# Patient Record
Sex: MALE | Race: OTHER | Employment: FULL TIME | URBAN - METROPOLITAN AREA
[De-identification: names, ages, dates, MRNs, and addresses within clinical notes are randomized per-mention and may not be internally consistent; named-entity substitution may affect disease eponyms.]

---

## 2019-11-18 ENCOUNTER — OFFICE VISIT (OUTPATIENT)
Dept: INTERNAL MEDICINE CLINIC | Age: 39
End: 2019-11-18

## 2019-11-18 VITALS
HEART RATE: 60 BPM | RESPIRATION RATE: 18 BRPM | WEIGHT: 226.4 LBS | HEIGHT: 70 IN | TEMPERATURE: 98.6 F | SYSTOLIC BLOOD PRESSURE: 164 MMHG | OXYGEN SATURATION: 96 % | BODY MASS INDEX: 32.41 KG/M2 | DIASTOLIC BLOOD PRESSURE: 80 MMHG

## 2019-11-18 DIAGNOSIS — C64.2 CANCER OF LEFT KIDNEY PARENCHYMA (HCC): ICD-10-CM

## 2019-11-18 DIAGNOSIS — R10.9 ABDOMINAL PAIN, UNSPECIFIED ABDOMINAL LOCATION: ICD-10-CM

## 2019-11-18 DIAGNOSIS — C80.1 CANCER (HCC): ICD-10-CM

## 2019-11-18 DIAGNOSIS — Z00.00 PREVENTATIVE HEALTH CARE: Primary | ICD-10-CM

## 2019-11-18 PROBLEM — I10 HTN (HYPERTENSION): Status: ACTIVE | Noted: 2019-11-18

## 2019-11-18 RX ORDER — AMLODIPINE BESYLATE 2.5 MG/1
2.5 TABLET ORAL DAILY
Qty: 30 TAB | Refills: 11 | Status: SHIPPED | OUTPATIENT
Start: 2019-11-18

## 2019-11-18 RX ORDER — POLYETHYLENE GLYCOL 3350 17 G/17G
17 POWDER, FOR SOLUTION ORAL 2 TIMES DAILY
Qty: 60 PACKET | Refills: 11 | Status: SHIPPED | OUTPATIENT
Start: 2019-11-18

## 2019-11-18 NOTE — PROGRESS NOTES
SPORTS MEDICINE AND PRIMARY CARE  Carolyn Barrios MD, [address] [address]  Phone:  [phone]  Fax: [phone]    Chief Complaint   Patient presents with    Establish Care       SUBJECTIVE:    Sven Ritchie is a 45 y.o. male Patient is seen as a new patient for evaluation and ongoing care. Review of [facility] notes indicates he has cancer and he comes in for evaluation of abdominal pain. Patient comes in today stating in 2015 he was found to have stage III left kidney cancer, underwent a nephrectomy at [facility] in [place], [place]. For the next five years he was recommended to have a chest x-ray, as well as a CT of the abdomen, looking for recurrence. The most recent CT was performed at the imaging center here in [address], as well as a chest x-ray about three weeks ago. Results are not available to us and he is seen for evaluation. However, since Friday he has noted some bloating and sensation of constipation. He has had a bowel movement, but still does not feel he has evacuated completely and is seen for evaluation. Presumably the CT scan was negative. Past Medical History:   Diagnosis Date    Abdominal pain     Cancer of left kidney parenchyma ([facility] [address] 2015    nephrectomy    HTN (hypertension) 11/18/2019     History reviewed. No pertinent surgical history.   Not on File    REVIEW OF SYSTEMS:  General: negative for - chills or fever  ENT: negative for - headaches, nasal congestion or tinnitus  Respiratory: negative for - cough, hemoptysis, shortness of breath or wheezing  Cardiovascular : negative for - chest pain, edema, palpitations or shortness of breath  Gastrointestinal: negative for - abdominal pain, blood in stools, heartburn or nausea/vomiting  Genito-Urinary: no dysuria, trouble voiding, or hematuria  Musculoskeletal: negative for - gait disturbance, joint pain, joint stiffness or joint swelling  Neurological: no TIA or stroke symptoms  Hematologic: no bruises, no bleeding, no swollen glands  Integument: no lumps, mole changes, nail changes or rash  Endocrine:no malaise/lethargy or unexpected weight changes      Social History     Socioeconomic History    Marital status: SINGLE     Spouse name: Not on file    Number of children: Not on file    Years of education: Not on file    Highest education level: Not on file   Tobacco Use    Smoking status: Never Smoker    Smokeless tobacco: Never Used   Substance and Sexual Activity    Alcohol use: Yes     Comment: occasional    Drug use: Never    Sexual activity: Yes     Partners: Female     Birth control/protection: None     Family History   Problem Relation Age of Onset    Heart Disease Mother     Hypertension Father    Habits:  Occasional alcohol use. Discontinued cigarette use at the time of the diagnosis in 2015. No drug abuse. Social History:  The patient has a domestic female partner, they plan to get . They have no children. He has a bachelor's degree in [place], master's degree in creative writing, second master's degree in rhetoric and composition. 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 No Mormon preference. Family History:  Father 67 with hypertension. Mother 71 with atrial fibrillation. Two sisters, one brother, are alive and well. OBJECTIVE:     Visit Vitals  /80   Pulse 60   Temp 98.6 °F (37 °C) (Oral)   Resp 18   Ht 5' 10\" (1.778 m)   Wt 226 lb 6.4 oz (102.7 kg)   SpO2 96%   BMI 32.49 kg/m²     CONSTITUTIONAL: well , well nourished, appears age appropriate  EYES: perrla, eom intact  ENMT:moist mucous membranes, pharynx clear  NECK: supple.  Thyroid normal  RESPIRATORY: Chest: clear bilaterally  CARDIOVASCULAR: Heart: regular rate and rhythm  GASTROINTESTINAL: Abdomen: soft, bowel sounds active  HEMATOLOGIC: no pathological lymph nodes palpated  MUSCULOSKELETAL: Extremities: no edema, pulse 1+   INTEGUMENT: No unusual rashes or suspicious skin lesions noted. Nails appear normal.  NEUROLOGIC: non-focal exam   MENTAL STATUS: alert and oriented, appropriate affect     No results found for any previous visit. ASSESSMENT:   1. Preventative health care    2. Cancer ([facility] [address]    3. Abdominal pain, unspecified abdominal location    4. Cancer of left kidney parenchyma [facility])      This will complete a preventive healthcare visit, for which we discuss our recommendations. He has abdominal pain that is concerning to me. It may very well be related to adhesions, which I suspect is the case, but I think because of the change in bowel habits he should have a colonoscopy at some point in time. There is no urgency to it. In the interim, however, will place him on Miralax twice a day until his bowels are regular again and moving uneventfully. I am concerned about the history of nephrectomy, particularly since he said it was stage III, and will ask for oncology opinion to see if there is anything that should be recommended for him. In addition will refer him to urologist so he can get connected with someone that will check his CT and chest x-ray recommended by his previous urologist every six months for a total of five years and then yearly thereafter. 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 We encouraged him to continue his physical activity 30 minutes five days a week. We point out to him, however, his BMI represents obesity and certainly would like to see him get out of that range. Blood pressure elevation is noted. He says it was borderline before. We advised him that we would like to see it less than 120/80, and therefore will place him on Amlodipine 5 mg daily. He will come by in two weeks for blood pressure check and we will see him twice a year for that concern. We also note a murmur in the left precordial area and will ask for an echocardiogram to define the origin. He is agreeable to the plan. Discussed the patient's BMI with him. The BMI follow up plan is as follows:     dietary management education, guidance, and counseling  encourage exercise  monitor weight  prescribed dietary intake  I have discussed the diagnosis with the patient and the intended plan as seen in the  orders above. The patient understands and agees with the plan. The patient has   received an after visit summary and questions were answered concerning  future plans  Patient labs and/or xrays were reviewed  Past records were reviewed. PLAN:  .  Orders Placed This Encounter    URINALYSIS W/ RFLX MICROSCOPIC    CBC WITH AUTOMATED DIFF    METABOLIC PANEL, COMPREHENSIVE    LIPID PANEL    HEMOGLOBIN A1C WITH EAG    REFERRAL TO GASTROENTEROLOGY    [facility] Oncology ref [facility]    REFERRAL TO UROLOGY    AMB POC EKG ROUTINE W/ 12 LEADS, INTER & REP    polyethylene glycol (MIRALAX) 17 gram packet    amLODIPine (NORVASC) 2.5 mg tablet       Follow-up and Dispositions    · Return in about 2 weeks (around 12/2/2019) for bp check. ATTENTION:   This medical record was transcribed using an electronic medical records system. Although proofread, it may and can contain electronic and spelling errors. Other human spelling and other errors may be present. Corrections may be executed at a later time. Please feel free to contact us for any clarifications as needed.

## 2019-11-18 NOTE — PATIENT INSTRUCTIONS
Body Mass Index: Care Instructions Your Care Instructions Body mass index (BMI) can help you see if your weight is raising your risk for health problems. It uses a formula to compare how much you weigh with how tall you are. · A BMI lower than 18.5 is considered underweight. · A BMI between 18.5 and 24.9 is considered healthy. · A BMI between 25 and 29.9 is considered overweight. A BMI of 30 or higher is considered obese. If your BMI is in the normal range, it means that you have a lower risk for weight-related health problems. If your BMI is in the overweight or obese range, you may be at increased risk for weight-related health problems, such as high blood pressure, heart disease, stroke, arthritis or joint pain, and diabetes. If your BMI is in the underweight range, you may be at increased risk for health problems such as fatigue, lower protection (immunity) against illness, muscle loss, bone loss, hair loss, and hormone problems. BMI is just one measure of your risk for weight-related health problems. You may be at higher risk for health problems if you are not active, you eat an unhealthy diet, or you drink too much alcohol or use tobacco products. Follow-up care is a key part of your treatment and safety. Be sure to make and go to all appointments, and call your doctor if you are having problems. It's also a good idea to know your test results and keep a list of the medicines you take. How can you care for yourself at home? · Practice healthy eating habits. This includes eating plenty of fruits, vegetables, whole grains, lean protein, and low-fat dairy. · If your doctor recommends it, get more exercise. Walking is a good choice. Bit by bit, increase the amount you walk every day. Try for at least 30 minutes on most days of the week. · Do not smoke. Smoking can increase your risk for health problems.  If you need help quitting, talk to your doctor about stop-smoking programs and medicines. These can increase your chances of quitting for good. · Limit alcohol to 2 drinks a day for men and 1 drink a day for women. Too much alcohol can cause health problems. If you have a BMI higher than 25 · Your doctor may do other tests to check your risk for weight-related health problems. This may include measuring the distance around your waist. A waist measurement of more than 40 inches in men or 35 inches in women can increase the risk of weight-related health problems. · Talk with your doctor about steps you can take to stay healthy or improve your health. You may need to make lifestyle changes to lose weight and stay healthy, such as changing your diet and getting regular exercise. If you have a BMI lower than 18.5 · Your doctor may do other tests to check your risk for health problems. · Talk with your doctor about steps you can take to stay healthy or improve your health. You may need to make lifestyle changes to gain or maintain weight and stay healthy, such as getting more healthy foods in your diet and doing exercises to build muscle. Where can you learn more? Go to http://jay jay-enoch.info/. Enter S176 in the search box to learn more about \"Body Mass Index: Care Instructions. \" Current as of: October 13, 2016 Content Version: 11.4 © 0503-0884 [employer]wise[employer]. Care instructions adapted under license by [employer] (which disclaims liability or warranty for this information). If you have questions about a medical condition or this instruction, always ask your healthcare professional. [address] any warranty or liability for your use of this information.

## 2019-11-18 NOTE — PROGRESS NOTES
1. Have you been to the ER, urgent care clinic since your last visit? Hospitalized since your last visit? No    2. Have you seen or consulted any other health care providers outside of the [address] [place] [address] since your last visit? Include any pap smears or colon screening.  No     Wants to discuss abdominal issues and the removal of his kidney

## 2019-11-19 LAB
ALBUMIN SERPL-MCNC: 5.2 G/DL (ref 3.5–5.5)
ALBUMIN/GLOB SERPL: 2.7 {RATIO} (ref 1.2–2.2)
ALP SERPL-CCNC: 84 IU/L (ref 39–117)
ALT SERPL-CCNC: 21 IU/L (ref 0–44)
APPEARANCE UR: CLEAR
AST SERPL-CCNC: 22 IU/L (ref 0–40)
BASOPHILS # BLD AUTO: 0 X10E3/UL (ref 0–0.2)
BASOPHILS NFR BLD AUTO: 1 %
BILIRUB SERPL-MCNC: 1 MG/DL (ref 0–1.2)
BILIRUB UR QL STRIP: NEGATIVE
BUN SERPL-MCNC: 18 MG/DL (ref 6–20)
BUN/CREAT SERPL: 18 (ref 9–20)
CALCIUM SERPL-MCNC: 9.6 MG/DL (ref 8.7–10.2)
CHLORIDE SERPL-SCNC: 101 MMOL/L (ref 96–106)
CHOLEST SERPL-MCNC: 200 MG/DL (ref 100–199)
CO2 SERPL-SCNC: 21 MMOL/L (ref 20–29)
COLOR UR: YELLOW
CREAT SERPL-MCNC: 1.01 MG/DL (ref 0.76–1.27)
EOSINOPHIL # BLD AUTO: 0 X10E3/UL (ref 0–0.4)
EOSINOPHIL NFR BLD AUTO: 1 %
ERYTHROCYTE [DISTWIDTH] IN BLOOD BY AUTOMATED COUNT: 13.2 % (ref 12.3–15.4)
EST. AVERAGE GLUCOSE BLD GHB EST-MCNC: 105 MG/DL
GLOBULIN SER CALC-MCNC: 1.9 G/DL (ref 1.5–4.5)
GLUCOSE SERPL-MCNC: 93 MG/DL (ref 65–99)
GLUCOSE UR QL: NEGATIVE
HBA1C MFR BLD: 5.3 % (ref 4.8–5.6)
HCT VFR BLD AUTO: 43.4 % (ref 37.5–51)
HDLC SERPL-MCNC: 58 MG/DL
HGB BLD-MCNC: 14.9 G/DL (ref 13–17.7)
HGB UR QL STRIP: NEGATIVE
IMM GRANULOCYTES # BLD AUTO: 0 X10E3/UL (ref 0–0.1)
IMM GRANULOCYTES NFR BLD AUTO: 0 %
KETONES UR QL STRIP: NEGATIVE
LDLC SERPL CALC-MCNC: 115 MG/DL (ref 0–99)
LEUKOCYTE ESTERASE UR QL STRIP: NEGATIVE
LYMPHOCYTES # BLD AUTO: 1.6 X10E3/UL (ref 0.7–3.1)
LYMPHOCYTES NFR BLD AUTO: 39 %
MCH RBC QN AUTO: 29.7 PG (ref 26.6–33)
MCHC RBC AUTO-ENTMCNC: 34.3 G/DL (ref 31.5–35.7)
MCV RBC AUTO: 87 FL (ref 79–97)
MICRO URNS: NORMAL
MONOCYTES # BLD AUTO: 0.3 X10E3/UL (ref 0.1–0.9)
MONOCYTES NFR BLD AUTO: 7 %
NEUTROPHILS # BLD AUTO: 2.2 X10E3/UL (ref 1.4–7)
NEUTROPHILS NFR BLD AUTO: 52 %
NITRITE UR QL STRIP: NEGATIVE
PH UR STRIP: 5 [PH] (ref 5–7.5)
PLATELET # BLD AUTO: 265 X10E3/UL (ref 150–450)
POTASSIUM SERPL-SCNC: 4 MMOL/L (ref 3.5–5.2)
PROT SERPL-MCNC: 7.1 G/DL (ref 6–8.5)
PROT UR QL STRIP: NEGATIVE
RBC # BLD AUTO: 5.02 X10E6/UL (ref 4.14–5.8)
SODIUM SERPL-SCNC: 141 MMOL/L (ref 134–144)
SP GR UR: 1.03 (ref 1–1.03)
TRIGL SERPL-MCNC: 136 MG/DL (ref 0–149)
UROBILINOGEN UR STRIP-MCNC: 0.2 MG/DL (ref 0.2–1)
VLDLC SERPL CALC-MCNC: 27 MG/DL (ref 5–40)
WBC # BLD AUTO: 4.1 X10E3/UL (ref 3.4–10.8)

## 2019-11-26 ENCOUNTER — HOSPITAL ENCOUNTER (EMERGENCY)
Age: 39
Discharge: HOME | End: 2019-11-26
Attending: EMERGENCY MEDICINE
Payer: COMMERCIAL

## 2019-11-26 ENCOUNTER — APPOINTMENT (OUTPATIENT)
Dept: ULTRASOUND IMAGING | Age: 39
End: 2019-11-26
Attending: EMERGENCY MEDICINE
Payer: COMMERCIAL

## 2019-11-26 ENCOUNTER — APPOINTMENT (OUTPATIENT)
Dept: GENERAL RADIOLOGY | Age: 39
End: 2019-11-26
Attending: EMERGENCY MEDICINE
Payer: COMMERCIAL

## 2019-11-26 ENCOUNTER — HOSPITAL ENCOUNTER (OUTPATIENT)
Dept: NON INVASIVE DIAGNOSTICS | Age: 39
Discharge: HOME | End: 2019-11-26
Attending: INTERNAL MEDICINE
Payer: COMMERCIAL

## 2019-11-26 VITALS
RESPIRATION RATE: 16 BRPM | WEIGHT: 222.44 LBS | OXYGEN SATURATION: 95 % | TEMPERATURE: 99 F | SYSTOLIC BLOOD PRESSURE: 154 MMHG | HEIGHT: 70 IN | HEART RATE: 58 BPM | DIASTOLIC BLOOD PRESSURE: 99 MMHG | BODY MASS INDEX: 31.85 KG/M2

## 2019-11-26 DIAGNOSIS — N43.3 HYDROCELE, UNSPECIFIED HYDROCELE TYPE: Primary | ICD-10-CM

## 2019-11-26 DIAGNOSIS — C64.2 CANCER OF LEFT KIDNEY PARENCHYMA (HCC): ICD-10-CM

## 2019-11-26 LAB
APPEARANCE UR: CLEAR
BACTERIA URNS QL MICRO: NEGATIVE /HPF
BILIRUB UR QL: NEGATIVE
COLOR UR: ABNORMAL
ECHO LA AREA 4C: 21.9 CM2
ECHO LA MAJOR AXIS: 3.75 CM
ECHO LA VOL 4C: 73.16 ML (ref 18–58)
ECHO LV INTERNAL DIMENSION DIASTOLIC: 5.41 CM (ref 4.2–5.9)
ECHO LV INTERNAL DIMENSION SYSTOLIC: 3.13 CM
ECHO LV IVSD: 0.78 CM (ref 0.6–1)
ECHO LV MASS 2D: 234.2 G (ref 88–224)
ECHO LV POSTERIOR WALL DIASTOLIC: 1.16 CM (ref 0.6–1)
ECHO MV A VELOCITY: 77.5 CM/S
ECHO MV E VELOCITY: 63.45 CM/S
ECHO MV E/A RATIO: 0.82
ECHO RV INTERNAL DIMENSION: 4.16 CM
ECHO RV TAPSE: 1.99 CM (ref 1.5–2)
EPITH CASTS URNS QL MICRO: ABNORMAL /LPF
GLUCOSE UR STRIP.AUTO-MCNC: NEGATIVE MG/DL
HGB UR QL STRIP: ABNORMAL
KETONES UR QL STRIP.AUTO: 15 MG/DL
LEUKOCYTE ESTERASE UR QL STRIP.AUTO: NEGATIVE
LVFS 2D: 42.1 %
MUCOUS THREADS URNS QL MICRO: ABNORMAL /LPF
NITRITE UR QL STRIP.AUTO: NEGATIVE
PH UR STRIP: 5.5 [PH] (ref 5–8)
PROT UR STRIP-MCNC: NEGATIVE MG/DL
RBC #/AREA URNS HPF: ABNORMAL /HPF (ref 0–5)
SP GR UR REFRACTOMETRY: 1.02 (ref 1–1.03)
UR CULT HOLD, URHOLD: NORMAL
UROBILINOGEN UR QL STRIP.AUTO: 0.2 EU/DL (ref 0.2–1)
WBC URNS QL MICRO: ABNORMAL /HPF (ref 0–4)

## 2019-11-26 PROCEDURE — 74018 RADEX ABDOMEN 1 VIEW: CPT

## 2019-11-26 PROCEDURE — 76870 US EXAM SCROTUM: CPT

## 2019-11-26 PROCEDURE — 93306 TTE W/DOPPLER COMPLETE: CPT

## 2019-11-26 PROCEDURE — 99283 EMERGENCY DEPT VISIT LOW MDM: CPT

## 2019-11-26 PROCEDURE — 81001 URINALYSIS AUTO W/SCOPE: CPT

## 2019-11-26 NOTE — ED PROVIDER NOTES
The history is provided by the patient. No  was used. Constipation    This is a new problem. The current episode started more than 1 week ago. Associated symptoms include flatus and constipation. Pertinent negatives include no abdominal pain, no dysuria, no abdominal distention, no chills, no fever, no nausea, no back pain, no vomiting and no diarrhea. He has tried oral laxatives, osmotic agents and stimulants for the symptoms. The treatment provided significant relief. His past medical history is significant for abdominal surgery. His past medical history does not include dementia, neuromuscular disease, irritable bowel syndrome, neurologic disease, small bowel obstruction, nursing home resident, endocrine disease or metabolic disease. Past Medical History:   Diagnosis Date    Abdominal pain     [facility] of left kidney parenchyma ([facility] [address] 2015    nephrectomy    HTN (hypertension) 11/18/2019       History reviewed. No pertinent surgical history.       Family History:   Problem Relation Age of Onset    Heart Disease Mother     Hypertension Father        Social History     Socioeconomic History    Marital status: SINGLE     Spouse name: Not on file    Number of children: Not on file    Years of education: Not on file    Highest education level: Not on file   Occupational History    Not on file   Social Needs    Financial resource strain: Not on file    Food insecurity:     Worry: Not on file     Inability: Not on file    Transportation needs:     Medical: Not on file     Non-medical: Not on file   Tobacco Use    Smoking status: Never Smoker    Smokeless tobacco: Never Used   Substance and Sexual Activity    Alcohol use: Yes     Comment: occasional    Drug use: Never    Sexual activity: Yes     Partners: Female     Birth control/protection: None   Lifestyle    Physical activity:     Days per week: Not on file     Minutes per session: Not on file    Stress: Not on file Relationships    Social connections:     Talks on phone: Not on file     Gets together: Not on file     Attends Mosque service: Not on file     Active member of club or organization: Not on file     Attends meetings of clubs or organizations: Not on file     Relationship status: Not on file    Intimate partner violence:     Fear of current or ex partner: Not on file     Emotionally abused: Not on file     Physically abused: Not on file     Forced sexual activity: Not on file   Other Topics Concern    Not on file   Social History Narrative    Habits:  Occasional alcohol use. Discontinued cigarette use at the time of the diagnosis in 2015. No drug abuse.         Social History:  The patient has a domestic female partner, they plan to get . They have no children. He has a bachelor's degree in [place], master's degree in creative writing, second master's degree in rhetoric and composition. 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 No Mosque preference.         Family History:  Father 67 with hypertension. Mother 71 with atrial fibrillation. Two sisters, one brother, are alive and well. ALLERGIES: Patient has no known allergies. Review of Systems   Constitutional: Negative for activity change, chills and fever. HENT: Negative for nosebleeds, sore throat, trouble swallowing and voice change. Eyes: Negative for visual disturbance. Respiratory: Negative for shortness of breath. Cardiovascular: Negative for chest pain and palpitations. Gastrointestinal: Positive for constipation and flatus. Negative for abdominal distention, abdominal pain, diarrhea, nausea and vomiting. Genitourinary: Positive for testicular pain. Negative for difficulty urinating, discharge, dysuria, hematuria, penile pain, penile swelling, scrotal swelling and urgency. Musculoskeletal: Negative for back pain, neck pain and neck stiffness. Skin: Negative for color change.    Allergic/Immunologic: Negative for immunocompromised state. Neurological: Negative for dizziness, seizures, syncope, weakness, light-headedness, numbness and headaches. Psychiatric/Behavioral: Negative for behavioral problems, confusion, hallucinations, self-injury and suicidal ideas. Vitals:    11/26/19 1622   BP: (!) 169/104   Pulse: 64   Resp: 18   Temp: 98.2 °F (36.8 °C)   SpO2: 96%   Weight: 100.9 kg (222 lb 7.1 oz)   Height: 5' 10\" (1.778 m)            Physical Exam  Vitals signs and nursing note reviewed. Constitutional:       General: He is not in acute distress. Appearance: He is well-developed. He is not diaphoretic. HENT:      Head: Normocephalic and atraumatic. Eyes:      Pupils: Pupils are equal, round, and reactive to light. Neck:      Musculoskeletal: Normal range of motion and neck supple. Cardiovascular:      Rate and Rhythm: Normal rate and regular rhythm. Heart sounds: Normal heart sounds. No murmur. No friction rub. No gallop. Pulmonary:      Effort: Pulmonary effort is normal. No respiratory distress. Breath sounds: Normal breath sounds. No wheezing. Abdominal:      General: Bowel sounds are normal. There is no distension. Palpations: Abdomen is soft. Tenderness: There is no tenderness. There is no guarding or rebound. Genitourinary:     Penis: Normal and uncircumcised. Scrotum/Testes:         Right: Mass, tenderness or swelling not present. Left: Tenderness present. Mass or swelling not present. Musculoskeletal: Normal range of motion. Skin:     General: Skin is warm. Findings: No rash. Neurological:      Mental Status: He is alert and oriented to person, place, and time. Psychiatric:         Behavior: Behavior normal.         Thought Content:  Thought content normal.         Judgment: Judgment normal.          MDM     This is a 66-year-old male with past medical history, review of systems, physical exam as above, presenting with complaints of constipation, scrotal tenderness, and inability to achieve erection. Patient states approximately 2 weeks of constipation, states he has the urge to go, however feel he cannot completely evacuate his bowels. He states he was evaluated by his primary care physician, and referred to GI, who gave him some samples of medication the resulted in a small bowel movement today. Patient states he was scheduled for colonoscopy, in 6 days time. He denies abdominal pain, nausea, vomiting. He also endorses inability to achieve erection, as well as worsening testicular pain over this last several days, without known trauma, swelling, or dysuria. Patient endorses a history of stage III renal carcinoma status post resection, with surveillance CT obtained 4 weeks ago, without evidence of metastatic disease. I discussed with the patient the ability to diagnose and treat constipation in the emergency department, and how relieved I am at the results of his recent CT of his abdomen and pelvis. Plan to obtain KUB, for possible constipation, and offer enema if appropriate, will obtain UA, testicular ultrasound to evaluate for structural abnormalities, UTI, prostatitis. We will reassess, and make a disposition. Procedures    Update:  Unremarkable KUB, without significant fecal burden, or bowel abnormality, testicular ultrasound with small left hydrocele. Will discharge patient follow with GI as scheduled, states he has a colonoscopy scheduled in 6 days. Will provide urology follow-up, return precautions.

## 2019-11-26 NOTE — ED TRIAGE NOTES
Pt states that he has been constipated for two weeks, pt states that he had a small BM this morning. Pt states that he feels malaise and his testicles are sensitive. Pt denies NV, but states that he is having some diarrhea.

## 2019-11-26 NOTE — DISCHARGE INSTRUCTIONS
Patient Education        Care Instructions  Your Care Instructions    A hydrocele (say \"WL-otsc-[id number]\") is a buildup of watery fluid around one or both testicles. It causes the scrotum or groin area to swell. Many baby boys are born with this condition. It does not cause pain. The swelling it causes may look scary, but it is usually not a problem. It will probably go away by the time your baby is 3years old. Follow-up care is a key part of your child's treatment and safety. Be sure to make and go to all appointments, and call your doctor if your child is having problems. It's also a good idea to know your child's test results and keep a list of the medicines your child takes. How can you care for your child at home? · Most of the time, all you need to do is watch for any changes in the swelling. When should you call for help? Call your doctor now or seek immediate medical care if:    · The swelling comes and goes.     · The swelling causes pain.     · The swelling gets worse.    Watch closely for changes in your child's health, and be sure to contact your doctor if:    · Your child has new or increased pain.     · Your child does not get better as expected. Where can you learn more? Go to http://jay jay-enoch.info/. Enter M731 in the search box to learn more about \"Hydrocele in Children: Care Instructions. \"  Current as of: December 19, 2018  Content Version: 12.2  © 1052-5185 [employer]. Care instructions adapted under license by [employer] (which disclaims liability or warranty for this information). If you have questions about a medical condition or this instruction, always ask your healthcare professional. [place] [address] any warranty or liability for your use of this information.

## 2019-12-17 ENCOUNTER — TELEPHONE (OUTPATIENT)
Dept: ONCOLOGY | Age: 39
End: 2019-12-17

## 2019-12-17 NOTE — TELEPHONE ENCOUNTER
Called patient to schedule new patient appointment, with Dr. Andrew Rogers per referral.  Patient stated that he currently does not have cancer and has been seen by a urologist. Patient stated that his urologist stated he does not need to see an oncologist at this time.

## 2019-12-18 PROBLEM — Z00.00 PREVENTATIVE HEALTH CARE: Status: RESOLVED | Noted: 2019-11-18 | Resolved: 2019-12-18

## 2021-07-07 ENCOUNTER — TRANSCRIBE ORDER (OUTPATIENT)
Dept: SCHEDULING | Age: 41
End: 2021-07-07

## 2021-07-07 DIAGNOSIS — C64.9 RENAL CARCINOMA (HCC): ICD-10-CM

## 2021-07-07 DIAGNOSIS — K59.00 CONSTIPATION, UNSPECIFIED: ICD-10-CM

## 2021-07-07 DIAGNOSIS — R10.12 LUQ ABDOMINAL PAIN: ICD-10-CM

## 2021-07-07 DIAGNOSIS — R17 ELEVATED BILIRUBIN: ICD-10-CM

## 2021-07-07 DIAGNOSIS — K21.9 GERD (GASTROESOPHAGEAL REFLUX DISEASE): Primary | ICD-10-CM

## 2022-03-20 PROBLEM — I10 HTN (HYPERTENSION): Status: ACTIVE | Noted: 2019-11-18

## 2023-05-23 RX ORDER — AMLODIPINE BESYLATE 2.5 MG/1
2.5 TABLET ORAL DAILY
COMMUNITY
Start: 2019-11-18

## 2023-05-23 RX ORDER — POLYETHYLENE GLYCOL 3350 17 G/17G
17 POWDER, FOR SOLUTION ORAL 2 TIMES DAILY
COMMUNITY
Start: 2019-11-18

## 2024-11-11 ENCOUNTER — OFFICE VISIT (OUTPATIENT)
Age: 44
End: 2024-11-11

## 2024-11-11 VITALS
HEIGHT: 70 IN | HEART RATE: 67 BPM | OXYGEN SATURATION: 96 % | DIASTOLIC BLOOD PRESSURE: 80 MMHG | BODY MASS INDEX: 30.49 KG/M2 | SYSTOLIC BLOOD PRESSURE: 150 MMHG | WEIGHT: 213 LBS

## 2024-11-11 DIAGNOSIS — Z00.00 ENCOUNTER FOR MEDICAL EXAMINATION TO ESTABLISH CARE: Primary | ICD-10-CM

## 2024-11-11 RX ORDER — LISINOPRIL AND HYDROCHLOROTHIAZIDE 10; 12.5 MG/1; MG/1
1 TABLET ORAL DAILY
COMMUNITY

## 2024-11-11 NOTE — PROGRESS NOTES
Chief Complaint   Patient presents with    New Patient     Vitals:    11/11/24 1519 11/11/24 1528   BP: (!) 150/80 (!) 150/80   Site: Left Upper Arm    Position: Sitting    Cuff Size: Medium Adult    Pulse: 67    SpO2: 96%    Weight: 96.6 kg (213 lb)    Height: 1.778 m (5' 10\")       BP (!) 150/80   Pulse 67   Ht 1.778 m (5' 10\")   Wt 96.6 kg (213 lb)   SpO2 96%   BMI 30.56 kg/m²

## 2024-11-11 NOTE — PROGRESS NOTES
Patient: Reza Klein  : 1980    Primary Cardiologist:Dr. JACKIE Cristina  EP Cardiologist:NONE   PCP: Keo Ruiz MD    Today's Date: 2024      ASSESSMENT AND PLAN:     Assessment and Plan:  HTN  Lisinopril HCT  Elevated today - reports normal readings at home    2.  Palpitations  7 day holter  Echo    3. CV risk  CCS      Follow up with Dr. JACKIE Cristina in 6 weeks.      ICD-10-CM    1. Encounter for medical examination to establish care  Z00.00 EKG 12 Lead          HISTORY OF PRESENT ILLNESS:     History of Present Illness:  Reza Klein is a 43 y.o. male referred for CV risk assessment.    Friday - pressure in hs chest, woke up at 3 am, heart racing. Panicked.    Saturday lessened, but still woke up 3 am.  Was able to go back to sleep.    Since then pressure.    Diagnosed 2016 renal cell carcinoma.  Hematuria. Sp L nephrectomy.  Surveillance with Urologist.  [place].  Moved to [facility]    Had not taken care of himself.  Didn't know hen had HTN.    Recent years taken better care of health.    GI kiran - gastritis.    FH - mother and father AFIB.  Uncles CAD.      Exercises 5-6 times a week, no limitations.      Denies chest pain, edema, syncope, shortness of breath at rest, dyspnea on exertion, PND or orthopnea.  Has no tachycardia, palpitations or sense of arrhythmia.     PAST MEDICAL HISTORY:     Past Medical History:   Diagnosis Date    Abdominal pain     Cancer of left kidney parenchyma (HCC) 2015    nephrectomy    HTN (hypertension) 2019        No past surgical history on file.    CURRENT MEDICATIONS:    .  Current Outpatient Medications   Medication Sig Dispense Refill    lisinopril-hydroCHLOROthiazide (PRINZIDE;ZESTORETIC) 10-12.5 MG per tablet Take 1 tablet by mouth daily      amLODIPine (NORVASC) 2.5 MG tablet Take 1 tablet by mouth daily (Patient not taking: Reported on 2024)      linaclotide (LINZESS) 145 MCG capsule Take 1 capsule by mouth every morning

## 2024-11-25 ENCOUNTER — TELEPHONE (OUTPATIENT)
Age: 44
End: 2024-11-25

## 2024-11-25 NOTE — TELEPHONE ENCOUNTER
Enrolled with [employer] - Ordered and being shipped to patient's home address on file.  ETA within 5-7 Business Days.        Palpitations  7 day holter

## 2025-03-25 ENCOUNTER — OFFICE VISIT (OUTPATIENT)
Age: 45
End: 2025-03-25
Payer: COMMERCIAL

## 2025-03-25 VITALS
HEART RATE: 62 BPM | SYSTOLIC BLOOD PRESSURE: 110 MMHG | DIASTOLIC BLOOD PRESSURE: 70 MMHG | OXYGEN SATURATION: 98 % | WEIGHT: 223 LBS | RESPIRATION RATE: 17 BRPM | HEIGHT: 70 IN | BODY MASS INDEX: 31.92 KG/M2

## 2025-03-25 DIAGNOSIS — C64.2 CANCER OF LEFT KIDNEY PARENCHYMA (HCC): ICD-10-CM

## 2025-03-25 DIAGNOSIS — I10 HYPERTENSION, UNSPECIFIED TYPE: Primary | ICD-10-CM

## 2025-03-25 DIAGNOSIS — Z71.89 CARDIAC RISK COUNSELING: ICD-10-CM

## 2025-03-25 PROCEDURE — 3074F SYST BP LT 130 MM HG: CPT | Performed by: INTERNAL MEDICINE

## 2025-03-25 PROCEDURE — 99214 OFFICE O/P EST MOD 30 MIN: CPT | Performed by: INTERNAL MEDICINE

## 2025-03-25 PROCEDURE — 3078F DIAST BP <80 MM HG: CPT | Performed by: INTERNAL MEDICINE

## 2025-03-25 NOTE — PROGRESS NOTES
Patient: Reza Klein  : 1980    Primary Cardiologist:Dr. JACKIE Cristina  EP Cardiologist:NONE   PCP: Keo Ruiz MD    Today's Date: 3/25/2025      ASSESSMENT AND PLAN:     Assessment and Plan:  HTN  Lisinopril HCT  Elevated today - reports normal readings at home    2.  Palpitations  7 day holter NSR rare PACs, PVCs, no arrhythmia  Echo normal LV fx, mild LVH, impaired relaxation     3. CV risk  CCS - not yet done, he knows how to do if he would like    4.  RCC sp L nephrectomy      Follow up with Dr. JACKIE Cristina in as needed.      ICD-10-CM    1. Hypertension, unspecified type  I10       2. Cardiac risk counseling  Z71.89       3. Cancer of left kidney parenchyma (HCC)  C64.2             HISTORY OF PRESENT ILLNESS:     History of Present Illness:  Reza Klein is a 44 y.o. male referred for CV risk assessment.    Friday - pressure in hs chest, woke up at 3 am, heart racing. Panicked.    Saturday lessened, but still woke up 3 am.  Was able to go back to sleep.    Since then pressure.    Diagnosed 2016 renal cell carcinoma.  Hematuria. Sp L nephrectomy.  Surveillance with Urologist.  [place].  Moved to [facility]    Had not taken care of himself.  Didn't know hen had HTN.    Recent years taken better care of health.    GI kiran - gastritis.    FH - mother and father AFIB.  Uncles CAD.      Exercises 5-6 times a week, no limitations.      Denies chest pain, edema, syncope, shortness of breath at rest, dyspnea on exertion, PND or orthopnea.  Has no tachycardia, palpitations or sense of arrhythmia.     PAST MEDICAL HISTORY:     Past Medical History:   Diagnosis Date    Abdominal pain     Cancer of left kidney parenchyma (HCC) 2015    nephrectomy    HTN (hypertension) 2019        No past surgical history on file.    CURRENT MEDICATIONS:    .  Current Outpatient Medications   Medication Sig Dispense Refill    lisinopril-hydroCHLOROthiazide (PRINZIDE;ZESTORETIC) 10-12.5 MG per tablet

## 2025-03-25 NOTE — PROGRESS NOTES
had concerns including Palpitations and Hypertension.    Vitals:    03/25/25 0909   BP: 110/70   BP Site: Left Upper Arm   Patient Position: Sitting   Pulse: 62   Resp: 17   SpO2: 98%   Weight: 101.2 kg (223 lb)   Height: 1.778 m (5' 10\")        Chest pain No    Refills No        1. Have you been to the ER, urgent care clinic since your last visit? No       Hospitalized since your last visit? No       Where?        Date?